# Patient Record
Sex: FEMALE | NOT HISPANIC OR LATINO | Employment: STUDENT | URBAN - METROPOLITAN AREA
[De-identification: names, ages, dates, MRNs, and addresses within clinical notes are randomized per-mention and may not be internally consistent; named-entity substitution may affect disease eponyms.]

---

## 2018-02-12 ENCOUNTER — RECORDS - HEALTHEAST (OUTPATIENT)
Dept: LAB | Facility: CLINIC | Age: 23
End: 2018-02-12

## 2018-02-12 LAB
BASOPHILS # BLD AUTO: 0 THOU/UL (ref 0–0.2)
BASOPHILS NFR BLD AUTO: 1 % (ref 0–2)
EOSINOPHIL # BLD AUTO: 0.1 THOU/UL (ref 0–0.4)
EOSINOPHIL NFR BLD AUTO: 1 % (ref 0–6)
ERYTHROCYTE [DISTWIDTH] IN BLOOD BY AUTOMATED COUNT: 12.8 % (ref 11–14.5)
HCT VFR BLD AUTO: 39.6 % (ref 35–47)
HGB BLD-MCNC: 13.2 G/DL (ref 12–16)
HIV 1+2 AB+HIV1 P24 AG SERPL QL IA: NEGATIVE
LYMPHOCYTES # BLD AUTO: 2.1 THOU/UL (ref 0.8–4.4)
LYMPHOCYTES NFR BLD AUTO: 32 % (ref 20–40)
MCH RBC QN AUTO: 29.7 PG (ref 27–34)
MCHC RBC AUTO-ENTMCNC: 33.3 G/DL (ref 32–36)
MCV RBC AUTO: 89 FL (ref 80–100)
MONOCYTES # BLD AUTO: 0.3 THOU/UL (ref 0–0.9)
MONOCYTES NFR BLD AUTO: 5 % (ref 2–10)
NEUTROPHILS # BLD AUTO: 3.9 THOU/UL (ref 2–7.7)
NEUTROPHILS NFR BLD AUTO: 61 % (ref 50–70)
PLATELET # BLD AUTO: 317 THOU/UL (ref 140–440)
PMV BLD AUTO: 10.5 FL (ref 8.5–12.5)
RBC # BLD AUTO: 4.44 MILL/UL (ref 3.8–5.4)
WBC: 6.5 THOU/UL (ref 4–11)

## 2018-02-13 LAB
ABO/RH(D): NORMAL
ABORH REPEAT: NORMAL
ANTIBODY SCREEN: NEGATIVE
BACTERIA SPEC CULT: NO GROWTH
HBV SURFACE AG SERPL QL IA: NEGATIVE
RUBV IGG SERPL QL IA: NORMAL
SYPHILIS RPR SCREEN - HISTORICAL: NORMAL

## 2018-03-07 ASSESSMENT — MIFFLIN-ST. JEOR: SCORE: 1439.91

## 2018-03-08 ENCOUNTER — ANESTHESIA - HEALTHEAST (OUTPATIENT)
Dept: SURGERY | Facility: HOSPITAL | Age: 23
End: 2018-03-08

## 2018-03-08 ENCOUNTER — SURGERY - HEALTHEAST (OUTPATIENT)
Dept: SURGERY | Facility: HOSPITAL | Age: 23
End: 2018-03-08

## 2018-06-14 ENCOUNTER — RECORDS - HEALTHEAST (OUTPATIENT)
Dept: LAB | Facility: CLINIC | Age: 23
End: 2018-06-14

## 2018-06-15 LAB — C TRACH DNA SPEC QL PROBE+SIG AMP: NEGATIVE

## 2018-07-05 ENCOUNTER — APPOINTMENT (OUTPATIENT)
Dept: GENERAL RADIOLOGY | Facility: CLINIC | Age: 23
End: 2018-07-05
Attending: PHYSICIAN ASSISTANT
Payer: COMMERCIAL

## 2018-07-05 ENCOUNTER — HOSPITAL ENCOUNTER (EMERGENCY)
Facility: CLINIC | Age: 23
Discharge: HOME | End: 2018-07-05
Attending: PHYSICIAN ASSISTANT | Admitting: PHYSICIAN ASSISTANT
Payer: COMMERCIAL

## 2018-07-05 VITALS
OXYGEN SATURATION: 98 % | HEIGHT: 61 IN | BODY MASS INDEX: 29.83 KG/M2 | RESPIRATION RATE: 18 BRPM | TEMPERATURE: 98.3 F | WEIGHT: 158 LBS

## 2018-07-05 DIAGNOSIS — M25.532 LEFT WRIST PAIN: ICD-10-CM

## 2018-07-05 PROCEDURE — 99213 OFFICE O/P EST LOW 20 MIN: CPT | Performed by: PHYSICIAN ASSISTANT

## 2018-07-05 PROCEDURE — G0463 HOSPITAL OUTPT CLINIC VISIT: HCPCS

## 2018-07-05 PROCEDURE — 73110 X-RAY EXAM OF WRIST: CPT | Mod: LT

## 2018-07-05 NOTE — ED AVS SNAPSHOT
[facility] Emergency Department    [address] [address]    Phone:  [phone]    Fax:  [phone]                                       Isidra Batres   MRN: [id number]    Department:  [facility] Emergency Department   Date of Visit:  7/5/2018           Patient Information     Date Of Birth          1995        Your diagnoses for this visit were:     Left wrist pain        You were seen by Adriana Olmstead PA-C.      Follow-up Information     Follow up with Cam Hernandez MD In 1 week.    Specialty:  Emergency Medicine    Why:  for recheck, consider repeat x-rays at that time     Contact information:    [facility]  [address] [address]  [phone]        24 Hour Appointment Hotline       To make an appointment at any [facility], call [phone] ([phone]). If you don't have a family doctor or clinic, we will help you find one. [facility] are conveniently located to serve the needs of you and your family.          ED Discharge Orders     Titan Wrist/Thumb Universal                    Review of your medicines      Notice     You have not been prescribed any medications.            Procedures and tests performed during your visit     XR Wrist Left G/E 3 Views      Orders Needing Specimen Collection     None      Pending Results     Date and Time Order Name Status Description    7/5/2018 1957 XR Wrist Left G/E 3 Views Preliminary             Pending Culture Results     No orders found from 7/3/2018 to 7/6/2018.            Pending Results Instructions     If you had any lab results that were not finalized at the time of your Discharge, you can call the ED Lab Result RN at [phone]. You will be contacted by this team for any positive Lab results or changes in treatment. The nurses are available 7 days a week from 10A to 6:30P.  You can leave a message 24 hours per day and they will return your call.        Test Results  "From Your Hospital Stay        2018  8:31 PM      Narrative     LEFT WRIST THREE VIEWS 2018 8:12 PM     HISTORY: Fell backwards and caught self with hands extended.     COMPARISON: None.        Impression     IMPRESSION: No definite fractures. There is some minimal irregularity  on the dorsal surface of the distal radius which appears to represent  normal variant as there is no discrete fracture line. If clinical  symptoms persist or progress, follow-up x-ray in one week might be  helpful in further evaluation.                 Thank you for choosing [facility]       Thank you for choosing [facility] for your care. Our goal is always to provide you with excellent care. Hearing back from our patients is one way we can continue to improve our services. Please take a few minutes to complete the written survey that you may receive in the mail after you visit with us. Thank you!        [employer] Information     [employer] lets you send messages to your doctor, view your test results, renew your prescriptions, schedule appointments and more. To sign up, go to [email or web address] . Click on \"Log in\" on the left side of the screen, which will take you to the Welcome page. Then click on \"Sign up Now\" on the right side of the page.     You will be asked to enter the access code listed below, as well as some personal information. Please follow the directions to create your username and password.     Your access code is: [email or web address]  Expires: 10/3/2018  8:36 PM     Your access code will  in 90 days. If you need help or a new code, please call your [facility] clinic or [phone].        Care EveryWhere ID     This is your Care EveryWhere ID. This could be used by other organizations to access your [facility] medical records  [id number]        Equal Access to Services     MIGUEL MORALES : yenny Loyd qaybta kaalmada adeegyada, waxay idiin hayaan adeeg kharash la'aan ah. So [facility] " [phone].    ATENCIÓN: Si habla español, tiene a schulte disposición servicios gratuitos de asistencia lingüística. Llame al [phone].    We comply with applicable federal civil rights laws and [place] laws. We do not discriminate on the basis of race, color, national origin, age, disability, sex, sexual orientation, or gender identity.            After Visit Summary       This is your record. Keep this with you and show to your community pharmacist(s) and doctor(s) at your next visit.

## 2018-07-05 NOTE — ED AVS SNAPSHOT
[facility] Emergency Department    [address] [address]    Phone:  [phone]    Fax:  [phone]                                       Isidra Batres   MRN: [id number]    Department:  [facility] Emergency Department   Date of Visit:  7/5/2018           After Visit Summary Signature Page     I have received my discharge instructions, and my questions have been answered. I have discussed any challenges I see with this plan with the nurse or doctor.    ..........................................................................................................................................  Patient/Patient Representative Signature      ..........................................................................................................................................  Patient Representative Print Name and Relationship to Patient    ..................................................               ................................................  Date                                            Time    ..........................................................................................................................................  Reviewed by Signature/Title    ...................................................              ..............................................  Date                                                            Time

## 2018-07-06 NOTE — ED PROVIDER NOTES
"  History     Chief Complaint   Patient presents with     Wrist Pain     left wrist pain after falling     HPI  Isidra Batres is a 22 year old right hand dominant female who presents to the urgent care with concern over left wrist pain after injury 7 hours prior to arrival.  Patient was at the [facility] balancing on a partially inflated object when she lost her balance and fell posteriorly onto her outstretched wrist.  She had immediate onset of pain.  Today she complains of swelling, intermittent paresthesias.  She has not had any ecchymosis, lacerations, abrasions or rashes.  She has attempted to treat with ibuprofen, last dose was one hour prior to arrival without any relief.      Problem List:    There are no active problems to display for this patient.       Past Medical History:    No past medical history on file.    Past Surgical History:    No past surgical history on file.    Family History:    No family history on file.    Social History:  Marital Status:  Single [1]  Social History   Substance Use Topics     Smoking status: Not on file     Smokeless tobacco: Not on file     Alcohol use Not on file        Medications:      No current outpatient prescriptions on file.      Review of Systems  INTEGUMENTARY/SKIN: NEGATIVE for ecchymosis, lacerations, abrasions or worrisome skin changes  MUSCULOSKELETAL: POSITIVE  for left wrist pain and NEGATIVE for other significant arthralgias or myalgias   NEURO: POSITIVE for intermittent paresthesias   Physical Exam   Heart Rate: 107  Temp: 98.3  F (36.8  C)  Resp: 18  Height: 154.9 cm (5' 1\")  Weight: 71.7 kg (158 lb)  SpO2: 98 %  Physical Exam   Constitutional: She is oriented to person, place, and time. She appears well-developed and well-nourished. No distress.   Cardiovascular:   Pulses:       Radial pulses are 2+ on the left side.   Musculoskeletal:        Left wrist: She exhibits decreased range of motion, tenderness, bony tenderness and swelling. " She exhibits no effusion, no crepitus and no laceration.        Left hand: She exhibits decreased range of motion (of left thumb due to discomfort ), tenderness and bony tenderness (overlying anatomic snuffbox). She exhibits normal capillary refill, no deformity, no laceration and no swelling. Normal sensation noted.   Neurological: She is alert and oriented to person, place, and time. No sensory deficit.   Skin: Skin is warm, dry and intact. No abrasion, no ecchymosis, no laceration and no rash noted. No erythema.     ED Course     ED Course     Procedures        Critical Care time:  none            Results for orders placed or performed during the hospital encounter of 07/05/18 (from the past 24 hour(s))   XR Wrist Left G/E 3 Views    Narrative    LEFT WRIST THREE VIEWS 7/5/2018 8:12 PM     HISTORY: Fell backwards and caught self with hands extended.     COMPARISON: None.      Impression    IMPRESSION: No definite fractures. There is some minimal irregularity  on the dorsal surface of the distal radius which appears to represent  normal variant as there is no discrete fracture line. If clinical  symptoms persist or progress, follow-up x-ray in one week might be  helpful in further evaluation.     IVA STERN MD     Medications - No data to display    Assessments & Plan (with Medical Decision Making)     I have reviewed the nursing notes.    I have reviewed the findings, diagnosis, plan and need for follow up with the patient.       There are no discharge medications for this patient.    Final diagnoses:   Left wrist pain     22-year-old female presents to urgent care with concern for left wrist pain after fall earlier this afternoon.  She was noted to be minimally tachycardic upon arrival, remainder vital signs within normal limits.  Physical findings as described above were significant for left wrist swelling, tenderness palpation and pain with left thumb movement and axial load.  X-ray was performed which did  not demonstrate any definitive acute fracture.  There was a minimal irregularity on the dorsal surface of the distal radius which was thought to be normal variant per radiology report, however can not definitively rule out fracture.  Patient and mother were notified of findings.  She was placed in a thumb spica splint given tenderness palpation over scaphoid bone and instructed to follow-up with primary care provider sports medicine for repeat x-ray in the next 7-10 days.  Worrisome reasons to return to the ER/UC prior to follow-up appointment discussed.    Disclaimer: This note consists of symbols derived from keyboarding, dictation, and/or voice recognition software. As a result, there may be errors in the script that have gone undetected.  Please consider this when interpreting information found in the chart.    7/5/2018   [facility] EMERGENCY DEPARTMENT     Adriana Olmstead PA-C  07/06/18 1433

## 2019-05-16 ENCOUNTER — RECORDS - HEALTHEAST (OUTPATIENT)
Dept: LAB | Facility: CLINIC | Age: 24
End: 2019-05-16

## 2019-05-17 LAB — C TRACH DNA SPEC QL PROBE+SIG AMP: NEGATIVE

## 2019-05-20 ENCOUNTER — AMBULATORY - RIVER FALLS (OUTPATIENT)
Dept: FAMILY MEDICINE | Facility: CLINIC | Age: 24
End: 2019-05-20
Payer: COMMERCIAL

## 2019-05-22 ENCOUNTER — AMBULATORY - RIVER FALLS (OUTPATIENT)
Dept: FAMILY MEDICINE | Facility: CLINIC | Age: 24
End: 2019-05-22
Payer: COMMERCIAL

## 2020-03-15 ENCOUNTER — COMMUNICATION - RIVER FALLS (OUTPATIENT)
Dept: FAMILY MEDICINE | Facility: CLINIC | Age: 25
End: 2020-03-15
Payer: COMMERCIAL

## 2020-03-15 ENCOUNTER — OFFICE VISIT - RIVER FALLS (OUTPATIENT)
Dept: FAMILY MEDICINE | Facility: CLINIC | Age: 25
End: 2020-03-15
Payer: COMMERCIAL

## 2021-05-29 ENCOUNTER — HEALTH MAINTENANCE LETTER (OUTPATIENT)
Age: 26
End: 2021-05-29

## 2021-06-01 VITALS — WEIGHT: 164.8 LBS | BODY MASS INDEX: 31.11 KG/M2 | HEIGHT: 61 IN

## 2021-06-16 NOTE — ANESTHESIA CARE TRANSFER NOTE
Last vitals:   Vitals:    03/08/18 1200   BP: 115/58   Pulse: 81   Resp: 14   Temp: 37.4  C (99.4  F)   SpO2: 97%     Patient's level of consciousness is drowsy  Spontaneous respirations: yes  Maintains airway independently: yes  Dentition unchanged: yes  Oropharynx: oropharynx clear of all foreign objects    QCDR Measures:  ASA# 20 - Surgical Safety Checklist: WHO surgical safety checklist completed prior to induction  PQRS# 430 - Adult PONV Prevention: 4558F - Pt received => 2 anti-emetic agents (different classes) preop & intraop  ASA# 8 - Peds PONV Prevention: NA - Not pediatric patient, not GA or 2 or more risk factors NOT present  PQRS# 424 - Lizzy-op Temp Management: 4559F - At least one body temp DOCUMENTED => 35.5C or 95.9F within required timeframe  PQRS# 426 - PACU Transfer Protocol: - Transfer of care checklist used  ASA# 14 - Acute Post-op Pain: ASA14B - Patient did NOT experience pain >= 7 out of 10

## 2021-06-16 NOTE — ANESTHESIA POSTPROCEDURE EVALUATION
Patient: Isidra Batres  SUCTION DILATION AND CURETTAGE  Anesthesia type: MAC    Patient location: PACU  Last vitals:   Vitals:    03/08/18 1300   BP: 122/74   Pulse: 75   Resp: 16   Temp:    SpO2: 98%     Post vital signs: stable  Level of consciousness: awake and responds to simple questions  Post-anesthesia pain: pain controlled  Post-anesthesia nausea and vomiting: no  Pulmonary: unassisted, return to baseline  Cardiovascular: stable and blood pressure at baseline  Hydration: adequate  Anesthetic events: no    QCDR Measures:  ASA# 11 - Lizzy-op Cardiac Arrest: ASA11B - Patient did NOT experience unanticipated cardiac arrest  ASA# 12 - Lizzy-op Mortality Rate: ASA12B - Patient did NOT die  ASA# 13 - PACU Re-Intubation Rate: ASA13B - Patient did NOT require a new airway mgmt  ASA# 10 - Composite Anes Safety: ASA10A - No serious adverse event    Additional Notes:

## 2021-06-16 NOTE — ANESTHESIA PREPROCEDURE EVALUATION
Anesthesia Evaluation        Airway   Mallampati: II  Neck ROM: full   Pulmonary - negative ROS and normal exam                          Cardiovascular - negative ROS and normal exam   Neuro/Psych - negative ROS     Endo/Other - negative ROS      GI/Hepatic/Renal - negative ROS           Dental                         Anesthesia Plan  Planned anesthetic: MAC    ASA 2   Induction: intravenous   Anesthetic plan and risks discussed with: patient    Post-op plan: routine recovery

## 2021-09-18 ENCOUNTER — HEALTH MAINTENANCE LETTER (OUTPATIENT)
Age: 26
End: 2021-09-18

## 2022-02-11 VITALS
SYSTOLIC BLOOD PRESSURE: 130 MMHG | WEIGHT: 161.6 LBS | BODY MASS INDEX: 30.53 KG/M2 | OXYGEN SATURATION: 96 % | DIASTOLIC BLOOD PRESSURE: 82 MMHG | TEMPERATURE: 97.4 F | HEART RATE: 114 BPM

## 2022-02-16 NOTE — NURSING NOTE
Comprehensive Intake Entered On:  3/15/2020 2:06 PM CDT    Performed On:  3/15/2020 2:01 PM CDT by Elicia Perez CMA   Chief Complaint :   c/o sore throat, dysphagia. Sx present since yeseterday.    Weight Measured :   161.6 lb(Converted to: 161 lb 10 oz, 73.30 kg)    Systolic Blood Pressure :   130 mmHg   Diastolic Blood Pressure :   82 mmHg (HI)    Mean Arterial Pressure :   98 mmHg   Peripheral Pulse Rate :   114 bpm (HI)    BP Site :   Right arm   BP Method :   Manual   HR Method :   Electronic   Temperature Tympanic :   97.4 DegF(Converted to: 36.3 DegC)  (LOW)    Oxygen Saturation :   96 %   Elicia Perez CMA - 3/15/2020 2:01 PM CDT   Health Status   Allergies Verified? :   Yes   Medication History Verified? :   Yes   Pre-Visit Planning Status :   N/A   Tobacco Use? :   Never smoker   Elicia Perez CMA - 3/15/2020 2:01 PM CDT   Consents   Consent for Immunization Exchange :   Consent Granted   Consent for Immunizations to Providers :   Consent Granted   Elicia Perez CMA - 3/15/2020 2:01 PM CDT   Meds / Allergies   (As Of: 3/15/2020 2:06:14 PM CDT)   Allergies (Active)   No Known Medication Allergies  Estimated Onset Date:   Unspecified ; Created By:   Liat Hernandez CMA; Reaction Status:   Active ; Category:   Drug ; Substance:   No Known Medication Allergies ; Type:   Allergy ; Updated By:   Liat Hernandez CMA; Reviewed Date:   11/20/2016 5:11 PM CST        Medication List   (As Of: 3/15/2020 2:06:14 PM CDT)   Home Meds    Miscellaneous Prescription  :   Miscellaneous Prescription ; Status:   Documented ; Ordered As Mnemonic:   birth control FE 1/20 ; Simple Display Line:   1 tab, po, daily, 0 Refill(s) ; Catalog Code:   Miscellaneous Prescription ; Order Dt/Tm:   10/20/2016 2:38:46 PM CDT

## 2022-02-16 NOTE — NURSING NOTE
PPD Reading POC Entered On:  5/22/2019 10:28 AM CDT    Performed On:  5/22/2019 10:21 AM CDT by Adelita Aguilar RN               PPD Reading   PPD mm of Induration :   0 mm   PPD Interpretation :   Negative   PPD Completion Status :   Completed   PPD Result Comment :   Patient was given copy of mantoux form   Adelita Aguilar RN - 5/22/2019 10:28 AM CDT

## 2022-02-16 NOTE — NURSING NOTE
PPD Administration POC Entered On:  5/20/2019 10:25 AM CDT    Performed On:  5/20/2019 10:25 AM CDT by Adelita Aguilar RN               PPD Administration   PPD Insertion Site :   Right forearm   POC Test Comments :   Wheal created   PPD Amount Administered (mL) :   0.1 mL   Adelita Aguilar RN - 5/20/2019 10:25 AM CDT   Details   Collection Date :   5/20/2019 10:06 AM CDT   Expiration Date :   5/22/2021 CDT   Lot#/Manufacture :   [id number]    :   [employer] Sprague RN, Tara D - 5/20/2019 10:25 AM CDT

## 2022-02-16 NOTE — PROGRESS NOTES
Chief Complaint    c/o sore throat, dysphagia. Sx present since yeseterday.  History of Present Illness      Chief complaint as above reviewed and confirmed with patient.  Pt presents to the clinic with concerns re: sore throat.  Mild uri sx earlier in the week but that is mild and stable.   no fevers. No nausea, vomiting.  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  Review of Systems      Review of systems is negative with the exception of those noted in HPI          Physical Exam   Vitals & Measurements    T: 97.4   F (Tympanic)  HR: 114(Peripheral)  BP: 130/82  SpO2: 96%     WT: 161.6 lb           Vitals as above per nursing documentation           Constitutional : nad appears well          Ears: ears patent B, TMS intact, noninjected           Nose: nasal mucosa is non-ededmatous. no discharge           Throat: pharynx is nonerythematous, no tonsillar hypertrophy, no exudate           Neck: neck supple, no adenopathy, no thyromegaly, no rigidity           Lungs: lungs CTA', no Wheezes, rhonchi or rales           Heart: heart RRR, nl S1, S2 no murmur           skin:  No rashes            RST : detected  Assessment/Plan       1. Strep throat (J02.0)         Amoxil as ordered.   Push fluids, rest and ibuprofen or tylenol for comfort.  Pt instructed to return to clinic for persistent or worsening symptoms.                           Orders:         amoxicillin, = 1 tab(s) ( 875 mg ), Oral, bid, x 10 day(s), # 20 tab(s), 0 Refill(s), Type: Acute, Pharmacy: [id number], 1 tab(s) Oral bid,x10 day(s), (Ordered)         POC, GROUP A STREP* ([facility]), Specimen Type: Swab, Collection Date: 03/15/20 14:09:00 CDT  Patient Information     Name:TOMMIE GOODE ZENA BERGMAN      Address:      [address] [place] [phone]     Sex:Female     YOB: 1995     [phone]     Emergency Contact:TYLER GOODE     MRN:[id number]     FIN:[id number]     Location:[facility]  Clinics     Date of Service:03/15/2020      Primary Care Physician:       NONE ,       Attending Physician:       Evelina ALMANZAR, Moriah TAI, [phone]  Problem List/Past Medical History    Ongoing     No qualifying data    Historical     No qualifying data  Medications    amoxicillin 875 mg oral tablet, 875 mg= 1 tab(s), Oral, bid    birth control FE 1/20, 1 tab, Oral, daily  Allergies    No Known Medication Allergies  Social History    Smoking Status - 03/15/2020     Never smoker     Alcohol - Denies Alcohol Use, 11/25/2016     Exercise - Does not exercise, 11/25/2016     Substance Abuse - Denies Substance Abuse, 11/25/2016     Tobacco - Denies Tobacco Use, 11/25/2016  Family History    Diabetes mellitus: Father.    Hypothyroidism: Sister.

## 2022-06-19 ENCOUNTER — HEALTH MAINTENANCE LETTER (OUTPATIENT)
Age: 27
End: 2022-06-19

## 2022-11-19 ENCOUNTER — HEALTH MAINTENANCE LETTER (OUTPATIENT)
Age: 27
End: 2022-11-19

## 2023-07-02 ENCOUNTER — HEALTH MAINTENANCE LETTER (OUTPATIENT)
Age: 28
End: 2023-07-02